# Patient Record
Sex: FEMALE
[De-identification: names, ages, dates, MRNs, and addresses within clinical notes are randomized per-mention and may not be internally consistent; named-entity substitution may affect disease eponyms.]

---

## 2018-03-20 ENCOUNTER — HOSPITAL ENCOUNTER (EMERGENCY)
Dept: HOSPITAL 92 - ERS | Age: 33
Discharge: HOME | End: 2018-03-20
Payer: SELF-PAY

## 2018-03-20 DIAGNOSIS — S39.012A: Primary | ICD-10-CM
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
## 2018-03-20 LAB
PREGU CONTROL BACKGROUND?: (no result)
PREGU CONTROL BAR APPEAR?: YES

## 2018-03-20 PROCEDURE — 81025 URINE PREGNANCY TEST: CPT

## 2018-03-20 PROCEDURE — 72100 X-RAY EXAM L-S SPINE 2/3 VWS: CPT
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
## 2019-04-15 ENCOUNTER — HOSPITAL ENCOUNTER (OUTPATIENT)
Dept: HOSPITAL 92 - BICULT | Age: 34
Discharge: HOME | End: 2019-04-15
Attending: OBSTETRICS & GYNECOLOGY
Payer: COMMERCIAL

## 2019-04-15 DIAGNOSIS — Z3A.21: ICD-10-CM

## 2019-04-15 DIAGNOSIS — Z34.82: Primary | ICD-10-CM

## 2019-04-15 PROCEDURE — 76805 OB US >/= 14 WKS SNGL FETUS: CPT

## 2019-04-15 NOTE — ULT
OB ULTRASOUND:

 

INDICATIONS:

Fetal anatomy.

 

FINDINGS:

There is a single viable intrauterine gestation.  Gestational age by ultrasound is 21 weeks 4 days.

 

BPD:  21 weeks 3 days

HC:   21 weeks 1 day

AC:   21 weeks 5 days

FL:   21 weeks 6 days

EFW:  436 g 21 weeks 5 days)

 

PLACNETA:  Anterior.

POSITION:  Vertex.

AMNIOTIC FLUID:  Adequate.  POONAM recorded at 16.83 cm.

FETAL HEART RATE:  144 beats per minute.

CERVICAL LENGTH:  5.14 cm.

 

Intracranial structures, visualized spine, stomach, kidneys, urinary bladder, four-chamber heart, fac
e, extremities, three-vessel cord, and cord insertion are all imaged and appear unremarkable.  No abn
ormality identified.

 

IMPRESSION:

A 21-week-4-day gestation by ultrasound measurement.  No abnormality identified.

 

POS: [facility]

## 2019-08-29 ENCOUNTER — HOSPITAL ENCOUNTER (INPATIENT)
Dept: HOSPITAL 92 - L&D/OP | Age: 34
LOS: 1 days | Discharge: HOME | End: 2019-08-30
Attending: OBSTETRICS & GYNECOLOGY | Admitting: OBSTETRICS & GYNECOLOGY
Payer: MEDICAID

## 2019-08-29 VITALS — BODY MASS INDEX: 38.7 KG/M2

## 2019-08-29 DIAGNOSIS — Z3A.40: ICD-10-CM

## 2019-08-29 DIAGNOSIS — O48.0: Primary | ICD-10-CM

## 2019-08-29 LAB
BASOPHILS # BLD AUTO: 0 THOU/UL (ref 0–0.2)
BASOPHILS NFR BLD AUTO: 0.1 % (ref 0–1)
EOSINOPHIL # BLD AUTO: 0.1 THOU/UL (ref 0–0.7)
EOSINOPHIL NFR BLD AUTO: 1.2 % (ref 0–10)
HBCM INDEX: 0.05 S/CO (ref 0–0.79)
HGB BLD-MCNC: 13.3 G/DL (ref 12–16)
HIV 1/2 INDEX: 0.12 S/CO (ref ?–1)
LYMPHOCYTES # BLD: 1.9 THOU/UL (ref 1.2–3.4)
LYMPHOCYTES NFR BLD AUTO: 22.1 % (ref 21–51)
MCH RBC QN AUTO: 31 PG (ref 27–31)
MCV RBC AUTO: 87.9 FL (ref 78–98)
MONOCYTES # BLD AUTO: 0.4 THOU/UL (ref 0.11–0.59)
MONOCYTES NFR BLD AUTO: 5 % (ref 0–10)
NEUTROPHILS # BLD AUTO: 6.2 THOU/UL (ref 1.4–6.5)
NEUTROPHILS NFR BLD AUTO: 71.6 % (ref 42–75)
PLATELET # BLD AUTO: 248 THOU/UL (ref 130–400)
RBC # BLD AUTO: 4.3 MILL/UL (ref 4.2–5.4)
SYPHILIS ANTIBODY INDEX: 0.04 S/CO
WBC # BLD AUTO: 8.7 THOU/UL (ref 4.8–10.8)

## 2019-08-29 PROCEDURE — 86705 HEP B CORE ANTIBODY IGM: CPT

## 2019-08-29 PROCEDURE — 36415 COLL VENOUS BLD VENIPUNCTURE: CPT

## 2019-08-29 PROCEDURE — S0020 INJECTION, BUPIVICAINE HYDRO: HCPCS

## 2019-08-29 PROCEDURE — 86850 RBC ANTIBODY SCREEN: CPT

## 2019-08-29 PROCEDURE — 86900 BLOOD TYPING SEROLOGIC ABO: CPT

## 2019-08-29 PROCEDURE — 85025 COMPLETE CBC W/AUTO DIFF WBC: CPT

## 2019-08-29 PROCEDURE — 86780 TREPONEMA PALLIDUM: CPT

## 2019-08-29 PROCEDURE — 85027 COMPLETE CBC AUTOMATED: CPT

## 2019-08-29 PROCEDURE — 99285 EMERGENCY DEPT VISIT HI MDM: CPT

## 2019-08-29 PROCEDURE — 87389 HIV-1 AG W/HIV-1&-2 AB AG IA: CPT

## 2019-08-29 PROCEDURE — 86901 BLOOD TYPING SEROLOGIC RH(D): CPT

## 2019-08-29 PROCEDURE — 51702 INSERT TEMP BLADDER CATH: CPT

## 2019-08-29 RX ADMIN — DOCUSATE CALCIUM SCH MG: 240 CAPSULE, LIQUID FILLED ORAL at 22:00

## 2019-08-30 VITALS — DIASTOLIC BLOOD PRESSURE: 52 MMHG | TEMPERATURE: 98.4 F | SYSTOLIC BLOOD PRESSURE: 100 MMHG

## 2019-08-30 LAB
HGB BLD-MCNC: 11.2 G/DL (ref 12–16)
MCH RBC QN AUTO: 31.1 PG (ref 27–31)
MCV RBC AUTO: 90.1 FL (ref 78–98)
PLATELET # BLD AUTO: 193 THOU/UL (ref 130–400)
RBC # BLD AUTO: 3.6 MILL/UL (ref 4.2–5.4)
WBC # BLD AUTO: 11.8 THOU/UL (ref 4.8–10.8)

## 2019-08-30 RX ADMIN — DOCUSATE CALCIUM SCH MG: 240 CAPSULE, LIQUID FILLED ORAL at 10:27
